# Patient Record
Sex: MALE | Race: WHITE | NOT HISPANIC OR LATINO | Employment: OTHER | URBAN - NONMETROPOLITAN AREA
[De-identification: names, ages, dates, MRNs, and addresses within clinical notes are randomized per-mention and may not be internally consistent; named-entity substitution may affect disease eponyms.]

---

## 2023-10-05 NOTE — PROGRESS NOTES
Subjective    Mr. Flores is 68 y.o. male    Chief Complaint: hospital follow up urinary retention    History of Present Illness    68-year-old male new patient referred in hospital follow-up after patient reports the end of July of this year patient was hospitalized at outlying facility due to pneumonia and during hospitalization developed incomplete bladder emptying requiring indwelling Escalante catheter placement as patient was without his twice daily saw palmetto.  Since starting back on saw palmetto after discharge reports no further difficulty with urination.  Overall feels as though bladder is emptying and is not experiencing a lot of urinary frequency urgency or nocturia.  Patient's only complaint is occasional weak stream.  Patient reports during hospitalization nursing staff had difficulty inserting the indwelling catheter around the prostate.    The following portions of the patient's history were reviewed and updated as appropriate: allergies, current medications, past family history, past medical history, past social history, past surgical history and problem list.    Review of Systems   Constitutional:  Negative for chills and fever.   Gastrointestinal:  Negative for abdominal pain, anal bleeding and blood in stool.   Genitourinary:  Positive for difficulty urinating. Negative for dysuria and hematuria.         Current Outpatient Medications:     aspirin 81 MG EC tablet, Take 1 tablet by mouth Daily., Disp: , Rfl:     diazePAM (VALIUM) 10 MG tablet, Take 1 tablet by mouth., Disp: , Rfl:     gabapentin (NEURONTIN) 300 MG capsule, Take 1 capsule by mouth., Disp: , Rfl:     glipizide (GLUCOTROL XL) 2.5 MG 24 hr tablet, Take 1 tablet by mouth Daily., Disp: , Rfl:     indapamide (LOZOL) 2.5 MG tablet, Take 1 tablet by mouth Daily., Disp: , Rfl:     lisinopril (PRINIVIL,ZESTRIL) 20 MG tablet, Take 1 tablet by mouth 2 (Two) Times a Day., Disp: , Rfl:     metFORMIN (GLUCOPHAGE) 1000 MG tablet, Take 1 tablet by  "mouth 2 (Two) Times a Day., Disp: , Rfl:     metoprolol succinate XL (TOPROL-XL) 25 MG 24 hr tablet, Take 2 tablets by mouth Daily., Disp: , Rfl:     omeprazole (priLOSEC) 20 MG capsule, Take 1 capsule by mouth Every 12 (Twelve) Hours., Disp: , Rfl:     simvastatin (ZOCOR) 20 MG tablet, Take 1 tablet by mouth Every Night., Disp: , Rfl:     History reviewed. No pertinent past medical history.    History reviewed. No pertinent surgical history.    Social History     Socioeconomic History    Marital status:        History reviewed. No pertinent family history.    Objective    Temp 97.1 °F (36.2 °C)   Ht 182.9 cm (72\")   Wt 96.3 kg (212 lb 3.2 oz)   BMI 28.78 kg/m²     Physical Exam  Constitutional:       Appearance: Normal appearance.   Abdominal:      Tenderness: There is no right CVA tenderness or left CVA tenderness.   Genitourinary:     Prostate: Enlarged. Not tender and no nodules present.      Comments: Moderately enlarged  Skin:     General: Skin is warm and dry.   Neurological:      Mental Status: He is alert and oriented to person, place, and time.   Psychiatric:         Mood and Affect: Mood normal.         Behavior: Behavior normal.             Results for orders placed or performed in visit on 10/18/23   POC Urinalysis Dipstick, Multipro    Specimen: Urine   Result Value Ref Range    Color Yellow Yellow, Straw, Dark Yellow, [place]    Clarity, UA Clear Clear    Glucose, UA Negative Negative mg/dL    Bilirubin Negative Negative    Ketones, UA Negative Negative    Specific Gravity  1.015 1.005 - 1.030    Blood, UA Negative Negative    pH, Urine 7.0 5.0 - 8.0    Protein, POC 30 mg/dL (A) Negative mg/dL    Urobilinogen, UA 0.2 E.U./dL Normal, 0.2 E.U./dL    Nitrite, UA Negative Negative    Leukocytes Negative Negative     Estimation of residual urine via abdominal ultrasound  Residual Urine: 88 ml  Indication: frequency  Position: Supine  Examination: Incremental scanning of the suprapubic area using 3 " MHz transducer using copious amounts of acoustic gel.   Findings: An anechoic area was demonstrated which represented the bladder, with measurement of residual urine as noted. I inspected this myself. In that the residual urine was stable or insignificant, no treatment will be necessary at this time.      IPSS Questionnaire (AUA-7):  Incomplete emptying  Over the past month, how often have you had a sensation of not emptying your bladder completely after you finish?: Less than 1 time in 5 (10/18/23 0938)  Frequency  Over the past month, how often have you had to urinate again less than two hours after you finishing urinating ?: Less than 1 time in 5 (10/18/23 0938)  Intermittency  Over the past month, how often have you found you stopped and started again several time when you urinated ?: Less than half the time (10/18/23 0938)  Urgency  Over the last month, how difficult  have you found it to postpone urination ?: Less than half the time (10/18/23 0938)  Weak Stream  Over the past month, how often have you had a weak urinary stream ?: Less than half the time (10/18/23 0938)  Straining  Over the past month, how often have you had to push or strain to begin urination ?: Less than half the time (10/18/23 0938)  Nocturia  Over the past month, how many times did you most typically get up to urinate from the time you went to bed until the time you got up in the morning ?: 2 times (10/18/23 0938)  Quality of life due to urinary symptoms  If you were to spend the rest of your life with your urinary condition the way it is now, how would feel about that?: Pleased (10/18/23 0938)    Scores  Total IPSS Score: 12 (10/18/23 0938)  Total Score = Symtomatic Level: moderately symptomatic: 8-19 (10/18/23 0938)         Assessment and Plan    Diagnoses and all orders for this visit:    1. Acute urinary retention (Primary)  -     POC Urinalysis Dipstick, Multipro      PVR today 88 mL-patient does not appear to be retaining large  volume urine    Digital rectal exam today prostate does appear moderately enlarged is nontender with no nodules felt    It appears as though patient's symptoms are well controlled with saw palmetto alone at present.  Patient reports has tried tamsulosin in the past however was unable to tolerate due to extreme dizziness    Current AUA symptom score 12/35    Recommend continuing twice daily saw palmetto for now may attempt to try a different medication in the future however have explained they all will likely cause the same side effect.  Have educated patient on TURP versus aqua ablation.  As well as finasteride.  Given that patient's symptoms are currently controlled now that back on saw palmetto patient would like to hold off on any further intervention    We will follow-up in 6 months

## 2023-10-18 ENCOUNTER — PATIENT ROUNDING (BHMG ONLY) (OUTPATIENT)
Dept: UROLOGY | Facility: CLINIC | Age: 68
End: 2023-10-18
Payer: MEDICARE

## 2023-10-18 ENCOUNTER — OFFICE VISIT (OUTPATIENT)
Dept: UROLOGY | Facility: CLINIC | Age: 68
End: 2023-10-18
Payer: COMMERCIAL

## 2023-10-18 VITALS — BODY MASS INDEX: 28.74 KG/M2 | TEMPERATURE: 97.1 F | WEIGHT: 212.2 LBS | HEIGHT: 72 IN

## 2023-10-18 DIAGNOSIS — R33.8 ACUTE URINARY RETENTION: Primary | ICD-10-CM

## 2023-10-18 LAB
BILIRUB BLD-MCNC: NEGATIVE MG/DL
CLARITY, POC: CLEAR
COLOR UR: YELLOW
GLUCOSE UR STRIP-MCNC: NEGATIVE MG/DL
KETONES UR QL: NEGATIVE
LEUKOCYTE EST, POC: NEGATIVE
NITRITE UR-MCNC: NEGATIVE MG/ML
PH UR: 7 [PH] (ref 5–8)
PROT UR STRIP-MCNC: ABNORMAL MG/DL
RBC # UR STRIP: NEGATIVE /UL
SP GR UR: 1.01 (ref 1–1.03)
UROBILINOGEN UR QL: ABNORMAL

## 2023-10-18 RX ORDER — ASPIRIN 81 MG/1
81 TABLET ORAL DAILY
COMMUNITY

## 2023-10-18 RX ORDER — SIMVASTATIN 20 MG
1 TABLET ORAL NIGHTLY
COMMUNITY

## 2023-10-18 RX ORDER — METOPROLOL SUCCINATE 25 MG/1
50 TABLET, EXTENDED RELEASE ORAL DAILY
COMMUNITY
Start: 2023-08-25

## 2023-10-18 RX ORDER — GLIPIZIDE 2.5 MG/1
2.5 TABLET, EXTENDED RELEASE ORAL DAILY
COMMUNITY
Start: 2023-07-06

## 2023-10-18 RX ORDER — OMEPRAZOLE 20 MG/1
20 CAPSULE, DELAYED RELEASE ORAL EVERY 12 HOURS SCHEDULED
COMMUNITY
Start: 2023-07-06

## 2023-10-18 RX ORDER — LISINOPRIL 20 MG/1
20 TABLET ORAL 2 TIMES DAILY
COMMUNITY
Start: 2023-09-07

## 2023-10-18 RX ORDER — DIAZEPAM 10 MG/1
10 TABLET ORAL
COMMUNITY
Start: 2023-08-29

## 2023-10-18 RX ORDER — GABAPENTIN 300 MG/1
300 CAPSULE ORAL
COMMUNITY
Start: 2023-08-29

## 2023-10-18 RX ORDER — INDAPAMIDE 2.5 MG/1
1 TABLET ORAL DAILY
COMMUNITY
Start: 2023-07-06

## 2023-10-18 NOTE — PROGRESS NOTES
October 18, 2023    Hello, may I speak with Sunil Flores?    My name is Monae      I am  with [facility]  [address], [address]  [place] [address] [phone].    Before we get started may I verify your date of birth? 1955    I am calling to officially welcome you to our practice and ask about your recent visit. Is this a good time to talk? yes    Tell me about your visit with us. What things went well?  Everything went well with my visit today. It was good.       We're always looking for ways to make our patients' experiences even better. Do you have recommendations on ways we may improve?  no    Overall were you satisfied with your first visit to our practice? yes       I appreciate you taking the time to speak with me today. Is there anything else I can do for you? no      Thank you, and have a great day.

## 2024-04-18 ENCOUNTER — TELEPHONE (OUTPATIENT)
Dept: UROLOGY | Facility: CLINIC | Age: 69
End: 2024-04-18
Payer: MEDICARE

## 2024-04-18 NOTE — TELEPHONE ENCOUNTER
I called patient but his wife answered. I let her know that he missed his appointment with Aurea today and I asked if he would want to reschedule. She said he will wait to see how his blood work comes back. I did let her know we had to no show him for today and she voiced understanding.